# Patient Record
Sex: MALE | Race: AMERICAN INDIAN OR ALASKA NATIVE
[De-identification: names, ages, dates, MRNs, and addresses within clinical notes are randomized per-mention and may not be internally consistent; named-entity substitution may affect disease eponyms.]

---

## 2021-03-13 ENCOUNTER — HOSPITAL ENCOUNTER (EMERGENCY)
Dept: HOSPITAL 5 - ED | Age: 31
Discharge: HOME | End: 2021-03-13
Payer: SELF-PAY

## 2021-03-13 VITALS — DIASTOLIC BLOOD PRESSURE: 84 MMHG | SYSTOLIC BLOOD PRESSURE: 131 MMHG
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
## 2021-03-13 DIAGNOSIS — T16.1XXA: Primary | ICD-10-CM
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
## 2021-03-13 DIAGNOSIS — F17.200: ICD-10-CM

## 2021-03-13 DIAGNOSIS — Z79.899: ICD-10-CM
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
## 2021-03-13 PROCEDURE — 99281 EMR DPT VST MAYX REQ PHY/QHP: CPT

## 2021-03-13 NOTE — EMERGENCY DEPARTMENT REPORT
ED ENT HPI





- General


Chief complaint: Earache


Stated complaint: EAR PAIN SOMETHING IS IN EAR


Time Seen by Provider: 03/13/21 12:12


Source: patient


Mode of arrival: Ambulatory


Limitations: No Limitations





- History of Present Illness


Initial comments: 





31-year-old male presents emerged department complaining of pain to the right 

ear suspicion of having a foreign body of an unknown etiology in the ear would 

like evaluated reports no dizziness, no vomiting, no fever, chills, sweats, no 

bleeding.


MD complaint: ear pain


-: Gradual


Location: R ear


Severity: mild


Quality: aching, dull


Consistency: constant


Associated Symptoms: denies: gum swelling, pain with swallowing, sore throat, 

tinnitus, rhinorrhea





- Related Data


                                  Previous Rx's











 Medication  Instructions  Recorded  Last Taken  Type


 


Promethazine /Codeine 5 ml PO Q6H PRN #90 ml 01/04/14 Unknown Rx





[Phenergan/Codeine 6.25-10 mg/5 ml]    


 


Neomy/Polymyx B/Hc (Otic) Soln 4 drops AD TID #1 bottle 03/13/21 Unknown Rx





[Cortisporin (Otic) Soln]    











                                    Allergies











Allergy/AdvReac Type Severity Reaction Status Date / Time


 


No Known Allergies Allergy   Unverified 01/04/14 19:28














ED Dental HPI





- General


Chief complaint: Earache


Stated complaint: EAR PAIN SOMETHING IS IN EAR


Time Seen by Provider: 03/13/21 12:12


Source: patient


Mode of arrival: Ambulatory


Limitations: No Limitations





- Related Data


                                  Previous Rx's











 Medication  Instructions  Recorded  Last Taken  Type


 


Promethazine /Codeine 5 ml PO Q6H PRN #90 ml 01/04/14 Unknown Rx





[Phenergan/Codeine 6.25-10 mg/5 ml]    


 


Neomy/Polymyx B/Hc (Otic) Soln 4 drops AD TID #1 bottle 03/13/21 Unknown Rx





[Cortisporin (Otic) Soln]    











                                    Allergies











Allergy/AdvReac Type Severity Reaction Status Date / Time


 


No Known Allergies Allergy   Unverified 01/04/14 19:28














ED Review of Systems


ROS: 


Stated complaint: EAR PAIN SOMETHING IS IN EAR


Other details as noted in HPI





Comment: All other systems reviewed and negative





ED Past Medical Hx





- Past Medical History


Previous Medical History?: No





- Surgical History


Past Surgical History?: No





- Social History


Smoking Status: Current Every Day Smoker


Substance Use Type: Alcohol





- Medications


Home Medications: 


                                Home Medications











 Medication  Instructions  Recorded  Confirmed  Last Taken  Type


 


Promethazine /Codeine 5 ml PO Q6H PRN #90 ml 01/04/14  Unknown Rx





[Phenergan/Codeine 6.25-10 mg/5 ml]     


 


Neomy/Polymyx B/Hc (Otic) Soln 4 drops AD TID #1 bottle 03/13/21  Unknown Rx





[Cortisporin (Otic) Soln]     














ED Physical Exam





- General


Limitations: No Limitations


General appearance: alert, in no apparent distress





- Head


Head exam: Present: atraumatic, normocephalic





- Eye


Eye exam: Present: normal appearance





- ENT


ENT exam: Present: mucous membranes moist, other (Green claylike foreign body 

substance into the right ear eardrum appears to be intact with small effusion.  

No bleeding at present)





- Neck


Neck exam: Present: normal inspection





- Respiratory


Respiratory exam: Present: normal lung sounds bilaterally.  Absent: respiratory 

distress





- Cardiovascular


Cardiovascular Exam: Present: regular rate, normal rhythm.  Absent: systolic 

murmur, diastolic murmur, rubs, gallop





- GI/Abdominal


GI/Abdominal exam: Present: soft, normal bowel sounds





- Rectal


Rectal exam: Present: deferred





- Extremities Exam


Extremities exam: Present: normal inspection





- Back Exam


Back exam: Present: normal inspection





- Neurological Exam


Neurological exam: Present: alert, oriented X3





- Psychiatric


Psychiatric exam: Present: normal affect, normal mood





- Skin


Skin exam: Present: warm, dry, intact, normal color.  Absent: rash





ED Course





                                   Vital Signs











  03/13/21





  11:42


 


Temperature 97.6 F


 


Pulse Rate 68


 


Respiratory 20





Rate 


 


Blood Pressure 131/84


 


O2 Sat by Pulse 100





Oximetry 














- Foreign Body Removal Ear


Location: ear canal (R)


Foreign Body Suspected: other


If Insect Suspected: ear canal inspected-intac


Foreign Body Removed: yes


Foreign Body Removal Technique: irrigation


Tympanic Membrane Intact: Yes


Patient Tolerated Procedure: well


Complications: bleeding (Scant bleeding to the floor of the ear canal where the 

foreign substance was removed)


Critical care attestation.: 


If time is entered above; I have spent that time in minutes in the direct care 

of this critically ill patient, excluding procedure time.








ED Disposition


Clinical Impression: 


 Ear foreign body





Disposition: DC-01 TO HOME OR SELFCARE


Is pt being admited?: No


Does the pt Need Aspirin: No


Condition: Stable


Instructions:  Ear Foreign Body


Prescriptions: 


Neomy/Polymyx B/Hc (Otic) Soln [Cortisporin (Otic) Soln] 4 drops AD TID #1 

bottle


Referrals: 


[facility] [Provider Group] - 3-5 Days

## 2021-04-25 ENCOUNTER — HOSPITAL ENCOUNTER (EMERGENCY)
Dept: HOSPITAL 5 - ED | Age: 31
Discharge: HOME | End: 2021-04-25
Payer: SELF-PAY

## 2021-04-25 VITALS — SYSTOLIC BLOOD PRESSURE: 127 MMHG | DIASTOLIC BLOOD PRESSURE: 86 MMHG

## 2021-04-25 DIAGNOSIS — Z79.899: ICD-10-CM

## 2021-04-25 DIAGNOSIS — F17.200: ICD-10-CM

## 2021-04-25 DIAGNOSIS — R10.9: ICD-10-CM

## 2021-04-25 DIAGNOSIS — N20.0: Primary | ICD-10-CM

## 2021-04-25 LAB
ALBUMIN SERPL-MCNC: 4.5 G/DL (ref 3.9–5)
ALT SERPL-CCNC: 15 UNITS/L (ref 7–56)
BASOPHILS # (AUTO): 0.1 K/MM3 (ref 0–0.1)
BASOPHILS NFR BLD AUTO: 0.9 % (ref 0–1.8)
BILIRUB UR QL STRIP: (no result)
BLOOD UR QL VISUAL: (no result)
BUN SERPL-MCNC: 8 MG/DL (ref 9–20)
BUN/CREAT SERPL: 8 %
CALCIUM SERPL-MCNC: 9.3 MG/DL (ref 8.4–10.2)
EOSINOPHIL # BLD AUTO: 0.3 K/MM3 (ref 0–0.4)
EOSINOPHIL NFR BLD AUTO: 3.5 % (ref 0–4.3)
HCT VFR BLD CALC: 40.9 % (ref 35.5–45.6)
HEMOLYSIS INDEX: 5
HGB BLD-MCNC: 14 GM/DL (ref 11.8–15.2)
LYMPHOCYTES # BLD AUTO: 1.6 K/MM3 (ref 1.2–5.4)
LYMPHOCYTES NFR BLD AUTO: 22.2 % (ref 13.4–35)
MCHC RBC AUTO-ENTMCNC: 34 % (ref 32–34)
MCV RBC AUTO: 81 FL (ref 84–94)
MONOCYTES # (AUTO): 0.6 K/MM3 (ref 0–0.8)
MONOCYTES % (AUTO): 8.9 % (ref 0–7.3)
MUCOUS THREADS #/AREA URNS HPF: (no result) /HPF
PH UR STRIP: 7 [PH] (ref 5–7)
PLATELET # BLD: 366 K/MM3 (ref 140–440)
PROT UR STRIP-MCNC: (no result) MG/DL
RBC # BLD AUTO: 5.06 M/MM3 (ref 3.65–5.03)
RBC #/AREA URNS HPF: 1 /HPF (ref 0–6)
UROBILINOGEN UR-MCNC: < 2 MG/DL (ref ?–2)
WBC #/AREA URNS HPF: < 1 /HPF (ref 0–6)

## 2021-04-25 PROCEDURE — 80053 COMPREHEN METABOLIC PANEL: CPT

## 2021-04-25 PROCEDURE — 83690 ASSAY OF LIPASE: CPT

## 2021-04-25 PROCEDURE — 36415 COLL VENOUS BLD VENIPUNCTURE: CPT

## 2021-04-25 PROCEDURE — 85025 COMPLETE CBC W/AUTO DIFF WBC: CPT

## 2021-04-25 PROCEDURE — 74176 CT ABD & PELVIS W/O CONTRAST: CPT

## 2021-04-25 PROCEDURE — 81001 URINALYSIS AUTO W/SCOPE: CPT

## 2021-04-25 NOTE — EMERGENCY DEPARTMENT REPORT
ED General Adult HPI





- General


Chief complaint: Abdominal Pain


Stated complaint: RIGHT SIDE PAIN


Time Seen by Provider: 04/25/21 19:22


Source: patient


Mode of arrival: Ambulatory


Limitations: No Limitations





- History of Present Illness


Initial comments: 





31-year-old -American male patient presents with complaints of right 

flank pain intermittently x2 months, worsening over the past few days.  He 

reports a history of right renal stone and states he has followed up with 

urology concerning this.  He denies any dysuria/hematuria, urinary frequency, 

nausea/vomiting, constipation/diarrhea, hematochezia/melena, or 

fever/chills/sweats.  No shortness of breath or chest pain per patient.  He 

denies any other past medical history and has not tried any OTC medication for 

his symptoms.  He he also denies any recent heavy lifting or numbness/tingling/

weakness in his limbs or difficulty with ambulation


-: Gradual





- Related Data


                                  Previous Rx's











 Medication  Instructions  Recorded  Last Taken  Type


 


Promethazine /Codeine 5 ml PO Q6H PRN #90 ml 01/04/14 Unknown Rx





[Phenergan/Codeine 6.25-10 mg/5 ml]    


 


Neomy/Polymyx B/Hc (Otic) Soln 4 drops AD TID #1 bottle 03/13/21 Unknown Rx





[Cortisporin (Otic) Soln]    


 


Ibuprofen [Motrin 800 MG tab] 800 mg PO Q8HR PRN #20 tablet 04/25/21 Unknown Rx











                                    Allergies











Allergy/AdvReac Type Severity Reaction Status Date / Time


 


No Known Allergies Allergy   Unverified 01/04/14 19:28














ED Review of Systems


ROS: 


Stated complaint: RIGHT SIDE PAIN


Other details as noted in HPI





Constitutional: denies: chills, diaphoresis, fever, malaise, weakness


Respiratory: denies: cough, shortness of breath


Cardiovascular: denies: chest pain


Gastrointestinal: abdominal pain.  denies: nausea, vomiting, diarrhea, 

constipation, hematemesis, melena, hematochezia


Genitourinary: denies: urgency, dysuria, frequency, hematuria


Musculoskeletal: as per HPI





ED Past Medical Hx





- Past Medical History


Previous Medical History?: Yes


Hx Kidney Stones: Yes





- Surgical History


Past Surgical History?: No





- Social History


Smoking Status: Current Every Day Smoker


Substance Use Type: Alcohol





- Medications


Home Medications: 


                                Home Medications











 Medication  Instructions  Recorded  Confirmed  Last Taken  Type


 


Promethazine /Codeine 5 ml PO Q6H PRN #90 ml 01/04/14  Unknown Rx





[Phenergan/Codeine 6.25-10 mg/5 ml]     


 


Neomy/Polymyx B/Hc (Otic) Soln 4 drops AD TID #1 bottle 03/13/21  Unknown Rx





[Cortisporin (Otic) Soln]     


 


Ibuprofen [Motrin 800 MG tab] 800 mg PO Q8HR PRN #20 tablet 04/25/21  Unknown Rx














ED Physical Exam





- General


Limitations: No Limitations


General appearance: alert, in no apparent distress





- Head


Head exam: Present: atraumatic, normocephalic





- Eye


Eye exam: Present: normal appearance.  Absent: scleral icterus





- Respiratory


Respiratory exam: Present: normal lung sounds bilaterally.  Absent: respiratory 

distress





- Cardiovascular


Cardiovascular Exam: Present: regular rate, normal rhythm





- GI/Abdominal


GI/Abdominal exam: Present: soft, tenderness (Mild right sided tenderness to 

palpation on), normal bowel sounds.  Absent: distended, guarding, rebound, rigid





- Extremities Exam


Extremities exam: Present: normal inspection





- Back Exam


Back exam: Present: normal inspection, full ROM, CVA tenderness (R) (Mild).  Abs

ent: paraspinal tenderness, vertebral tenderness





- Neurological Exam


Neurological exam: Present: alert, oriented X3, normal gait.  Absent: motor 

sensory deficit





- Expanded Neurological Exam


  ** Expanded


Sensory exam: Lower Extremity Light Touch: Normal


Motor strength exam: RLE: 5, LLE: 5





- Psychiatric


Psychiatric exam: Present: normal affect, normal mood





- Skin


Skin exam: Present: warm, dry, intact, normal color.  Absent: rash





ED Course


                                   Vital Signs











  04/25/21





  18:48


 


Temperature 99.7 F H


 


Pulse Rate 83


 


Respiratory 20





Rate 


 


Blood Pressure 127/86


 


O2 Sat by Pulse 96





Oximetry 














ED Medical Decision Making





- Lab Data


Result diagrams: 


                                 04/25/21 19:32





                                 04/25/21 19:32








                                   Lab Results











  04/25/21 04/25/21 04/25/21 Range/Units





  19:32 19:32 19:47 


 


WBC  7.1    (4.5-11.0)  K/mm3


 


RBC  5.06 H    (3.65-5.03)  M/mm3


 


Hgb  14.0    (11.8-15.2)  gm/dl


 


Hct  40.9    (35.5-45.6)  %


 


MCV  81 L    (84-94)  fl


 


MCH  28    (28-32)  pg


 


MCHC  34    (32-34)  %


 


RDW  12.8 L    (13.2-15.2)  %


 


Plt Count  366    (140-440)  K/mm3


 


Lymph % (Auto)  22.2    (13.4-35.0)  %


 


Mono % (Auto)  8.9 H    (0.0-7.3)  %


 


Eos % (Auto)  3.5    (0.0-4.3)  %


 


Baso % (Auto)  0.9    (0.0-1.8)  %


 


Lymph # (Auto)  1.6    (1.2-5.4)  K/mm3


 


Mono # (Auto)  0.6    (0.0-0.8)  K/mm3


 


Eos # (Auto)  0.3    (0.0-0.4)  K/mm3


 


Baso # (Auto)  0.1    (0.0-0.1)  K/mm3


 


Seg Neutrophils %  64.5    (40.0-70.0)  %


 


Seg Neutrophils #  4.6    (1.8-7.7)  K/mm3


 


Sodium   137   (137-145)  mmol/L


 


Potassium   4.8   (3.6-5.0)  mmol/L


 


Chloride   99.6   ()  mmol/L


 


Carbon Dioxide   31 H   (22-30)  mmol/L


 


Anion Gap   40   mmol/L


 


BUN   8 L   (9-20)  mg/dL


 


Creatinine   1.0   (0.8-1.3)  mg/dL


 


Estimated GFR   > 60   ml/min


 


BUN/Creatinine Ratio   8   %


 


Glucose   86   ()  mg/dL


 


Calcium   9.3   (8.4-10.2)  mg/dL


 


Total Bilirubin   0.30   (0.1-1.2)  mg/dL


 


AST   18   (5-40)  units/L


 


ALT   15   (7-56)  units/L


 


Alkaline Phosphatase   42   ()  units/L


 


Total Protein   7.9   (6.3-8.2)  g/dL


 


Albumin   4.5   (3.9-5)  g/dL


 


Albumin/Globulin Ratio   1.3   %


 


Lipase   19   (13-60)  units/L


 


Urine Color    Yellow  (Yellow)  


 


Urine Turbidity    Clear  (Clear)  


 


Urine pH    7.0  (5.0-7.0)  


 


Ur Specific Gravity    1.018  (1.003-1.030)  


 


Urine Protein    <15 mg/dl  (Negative)  mg/dL


 


Urine Glucose (UA)    Neg  (Negative)  mg/dL


 


Urine Ketones    Neg  (Negative)  mg/dL


 


Urine Blood    Neg  (Negative)  


 


Urine Nitrite    Neg  (Negative)  


 


Urine Bilirubin    Neg  (Negative)  


 


Urine Urobilinogen    < 2.0  (<2.0)  mg/dL


 


Ur Leukocyte Esterase    Neg  (Negative)  


 


Urine WBC (Auto)    < 1.0  (0.0-6.0)  /HPF


 


Urine RBC (Auto)    1.0  (0.0-6.0)  /HPF


 


Urine Mucus    Few  /HPF














- Radiology Data


Radiology results: report reviewed





 


CT abdomen pelvis wo con  


 


 INDICATION:  


 R flank pain/R LLQ pain, hx of kidney stones.   


 


 COMPARISON: None  


 


 TECHNIQUE: Abdominal and pelvic CT exam performed. All CT scans at this 

location are performed 


using CT dose reduction for ALARA by means of automated exposure control.  


 


 FINDINGS:  


 


 CT ABDOMEN and PELVIS:  


 Lung Bases: No significant abnormality.  


 Liver: No significant abnormality.  


 Biliary: No significant abnormality.  


 Spleen: No significant abnormality.  


 Pancreas: No significant abnormality.  


 Adrenals: No significant abnormality.  


 Kidneys: Punctate right interpolar nodular to renal stone. No hydronephrosis.  


 Lymphatics: No lymphadenopathy.  


 Vasculature: No significant abnormality.   


 Bowel: No significant abnormality.  Normal appendix.  


 Pelvis: No significant abnormality.  


 Osseous Structures: No aggressive osseous lesion.  


 Additional Findings: None  


 


 IMPRESSION:  


 1. No acute abnormality of the abdomen or pelvis.  


 2. Punctate nonobstructing right renal stone.  





- Medical Decision Making








31-year-old -American male patient presents with complaints of right 

flank pain intermittently x2 months, worsening over the past few days.  He 

reports a history of right renal stone and states he has followed up with 

urology concerning this.  He denies any dysuria/hematuria, urinary frequency, 

nausea/vomiting, constipation/diarrhea, hematochezia/melena, or 

fever/chills/sweats.  No shortness of breath or chest pain per patient.  He 

denies any other past medical history and has not tried any OTC medication for 

his symptoms.  He he also denies any recent heavy lifting or 

numbness/tingling/weakness in his limbs or difficulty with ambulation





Anion gap noted to be 40 on labs, however this was manually calculated at 6.5.





Mild right CVA tenderness and right-sided tenderness on exam.  CT abdomen shows 

renal stone without obstruction.  No other acute abnormalities noted on CT.  

CBC, CMP, and UA are without significant abnormalities.  Recommend patient 

continues to follow-up with urology.  We will try ibuprofen as needed for pain 

for now.  Discussed signs and symptoms that should prompt immediate return to 

the emergency department in detail with patient who verbalizes understanding.  

He is well-appearing, his vitals are normal, he is stable for discharge home.


Critical care attestation.: 


If time is entered above; I have spent that time in minutes in the direct care 

of this critically ill patient, excluding procedure time.








ED Disposition


Clinical Impression: 


 Flank pain, Right kidney stone





Disposition: DC-01 TO HOME OR SELFCARE


Is pt being admited?: No


Condition: Stable


Instructions:  Flank Pain, Adult, Easy-to-Read, Kidney Stones, Easy-to-Read, 

Renal Colic, Easy-to-Read


Additional Instructions: 


Please follow-up with your urologist within 1 week.


Prescriptions: 


Ibuprofen [Motrin 800 MG tab] 800 mg PO Q8HR PRN #20 tablet


 PRN Reason: pain


Referrals: 


PRIMARY CARE,MD [Primary Care Provider] - 3-5 Days


[facility] [Provider Group] - 3-5 Days

## 2021-04-25 NOTE — CAT SCAN REPORT
CT abdomen pelvis wo con



INDICATION:

R flank pain/R LLQ pain, hx of kidney stones. 



COMPARISON: None



TECHNIQUE: Abdominal and pelvic CT exam performed. All CT scans at this location are performed using 
CT dose reduction for ALARA by means of automated exposure control.



FINDINGS:



CT ABDOMEN and PELVIS:

Lung Bases: No significant abnormality.

Liver: No significant abnormality.

Biliary: No significant abnormality.

Spleen: No significant abnormality.

Pancreas: No significant abnormality.

Adrenals: No significant abnormality.

Kidneys: Punctate right interpolar nodular to renal stone. No hydronephrosis.

Lymphatics: No lymphadenopathy.

Vasculature: No significant abnormality. 

Bowel: No significant abnormality.  Normal appendix.

Pelvis: No significant abnormality.

Osseous Structures: No aggressive osseous lesion.

Additional Findings: None



IMPRESSION:

1. No acute abnormality of the abdomen or pelvis.

2. Punctate nonobstructing right renal stone.



Signer Name: Tj Lubin MD 

Signed: 4/25/2021 7:53 PM

Workstation Name: [id number]

## 2021-10-06 ENCOUNTER — HOSPITAL ENCOUNTER (EMERGENCY)
Dept: HOSPITAL 5 - ED | Age: 31
Discharge: HOME | End: 2021-10-06
Payer: SELF-PAY

## 2021-10-06 VITALS — DIASTOLIC BLOOD PRESSURE: 80 MMHG | SYSTOLIC BLOOD PRESSURE: 133 MMHG

## 2021-10-06 DIAGNOSIS — F17.200: ICD-10-CM

## 2021-10-06 DIAGNOSIS — M62.830: Primary | ICD-10-CM

## 2021-10-06 DIAGNOSIS — Z87.442: ICD-10-CM

## 2021-10-06 DIAGNOSIS — M54.50: ICD-10-CM

## 2021-10-06 DIAGNOSIS — Z79.899: ICD-10-CM

## 2021-10-06 DIAGNOSIS — Z72.89: ICD-10-CM

## 2021-10-06 PROCEDURE — 99281 EMR DPT VST MAYX REQ PHY/QHP: CPT

## 2021-10-06 NOTE — EMERGENCY DEPARTMENT REPORT
ED Back Pain/Injury HPI





- General


Chief Complaint: Back Pain/Injury


Stated Complaint: BACK STIFNESS


Time Seen by Provider: 10/06/21 21:18


Source: patient


Limitations: No Limitations





- History of Present Illness


MD Complaint: back pain, back injury


-: Gradual, days(s) (14)


Place: other (working out and developed a very stiff back)


Radiation: none


Severity: mild, moderate


Quality: dull, aching


Consistency: constant


Improves With: none


Worsens With: none





- Related Data


                                  Previous Rx's











 Medication  Instructions  Recorded  Last Taken  Type


 


Promethazine /Codeine 5 ml PO Q6H PRN #90 ml 01/04/14 Unknown Rx





[Phenergan/Codeine 6.25-10 mg/5 ml]    


 


Neomy/Polymyx B/Hc (Otic) Soln 4 drops AD TID #1 bottle 03/13/21 Unknown Rx





[Cortisporin (Otic) Soln]    


 


Ibuprofen [Motrin 800 MG tab] 800 mg PO Q8HR PRN #20 tablet 04/25/21 Unknown Rx


 


Ketorolac [Toradol] 10 mg PO Q6H PRN #15 tablet 10/06/21 Unknown Rx


 


methOCARBAMOL [Robaxin] 750 mg PO Q8H PRN #21 tablet 10/06/21 Unknown Rx











                                    Allergies











Allergy/AdvReac Type Severity Reaction Status Date / Time


 


No Known Allergies Allergy   Unverified 01/04/14 19:28














ED Review of Systems


ROS: 


Stated complaint: BACK STIFNESS


Other details as noted in HPI





Comment: All other systems reviewed and negative





ED Past Medical Hx





- Past Medical History


Previous Medical History?: Yes


Hx Kidney Stones: Yes





- Surgical History


Past Surgical History?: No





- Social History


Smoking Status: Current Every Day Smoker


Substance Use Type: Alcohol





- Medications


Home Medications: 


                                Home Medications











 Medication  Instructions  Recorded  Confirmed  Last Taken  Type


 


Promethazine /Codeine 5 ml PO Q6H PRN #90 ml 01/04/14  Unknown Rx





[Phenergan/Codeine 6.25-10 mg/5 ml]     


 


Neomy/Polymyx B/Hc (Otic) Soln 4 drops AD TID #1 bottle 03/13/21  Unknown Rx





[Cortisporin (Otic) Soln]     


 


Ibuprofen [Motrin 800 MG tab] 800 mg PO Q8HR PRN #20 tablet 04/25/21  Unknown Rx


 


Ketorolac [Toradol] 10 mg PO Q6H PRN #15 tablet 10/06/21  Unknown Rx


 


methOCARBAMOL [Robaxin] 750 mg PO Q8H PRN #21 tablet 10/06/21  Unknown Rx














ED Physical Exam





- General


Limitations: No Limitations


General appearance: alert, in no apparent distress





- Head


Head exam: Present: atraumatic, normocephalic





- Eye


Eye exam: Present: normal appearance





- ENT


ENT exam: Present: mucous membranes moist





- Neck


Neck exam: Present: normal inspection





- Respiratory


Respiratory exam: Present: normal lung sounds bilaterally.  Absent: respiratory 

distress





- Cardiovascular


Cardiovascular Exam: Present: regular rate, normal rhythm.  Absent: systolic 

murmur, diastolic murmur, rubs, gallop





- GI/Abdominal


GI/Abdominal exam: Present: soft, normal bowel sounds





- Rectal


Rectal exam: Present: deferred





- Extremities Exam


Extremities exam: Present: normal inspection, full ROM, normal capillary refill





- Back Exam


Back exam: Present: normal inspection.  Absent: CVA tenderness (R), CVA 

tenderness (L)





- Neurological Exam


Neurological exam: Present: alert, oriented X3, CN II-XII intact





- Psychiatric


Psychiatric exam: Present: normal affect, normal mood





- Skin


Skin exam: Present: warm, dry, intact, normal color.  Absent: rash





ED Course


                                   Vital Signs











  10/06/21





  21:11


 


Temperature 98.3 F


 


Pulse Rate 89


 


Respiratory 16





Rate 


 


Blood Pressure 133/80


 


O2 Sat by Pulse 100





Oximetry 











Critical care attestation.: 


If time is entered above; I have spent that time in minutes in the direct care 

of this critically ill patient, excluding procedure time.








ED Disposition


Clinical Impression: 


 Lumbar paraspinal muscle spasm





Disposition: 01 HOME / SELF CARE / HOMELESS


Is pt being admited?: No


Does the pt Need Aspirin: No


Condition: Stable


Instructions:  Muscle Cramps and Spasms, Easy-to-Read, Thoracic Strain, Back 

Injury Prevention


Prescriptions: 


methOCARBAMOL [Robaxin] 750 mg PO Q8H PRN #21 tablet


 PRN Reason: Spasms


Ketorolac [Toradol] 10 mg PO Q6H PRN #15 tablet


 PRN Reason: Pain


Referrals: 


DAYANA SAVAGE MD [Staff Physician] - 3-5 Days

## 2022-08-13 ENCOUNTER — HOSPITAL ENCOUNTER (EMERGENCY)
Dept: HOSPITAL 5 - ED | Age: 32
LOS: 1 days | Discharge: HOME | End: 2022-08-14
Payer: SELF-PAY

## 2022-08-13 DIAGNOSIS — M54.6: Primary | ICD-10-CM

## 2022-08-13 DIAGNOSIS — Z87.442: ICD-10-CM

## 2022-08-13 DIAGNOSIS — Z79.899: ICD-10-CM

## 2022-08-13 PROCEDURE — 72070 X-RAY EXAM THORAC SPINE 2VWS: CPT

## 2022-08-13 PROCEDURE — 99283 EMERGENCY DEPT VISIT LOW MDM: CPT

## 2022-08-13 PROCEDURE — 72100 X-RAY EXAM L-S SPINE 2/3 VWS: CPT

## 2022-08-13 NOTE — XRAY REPORT
Lumbar spine 3 views



INDICATION: Back pain



FINDINGS: Alignment appears normal. No compression fractures seen. Sacrum and sacroiliac joints appea
r normal.



Thoracic spine 4 views



INDICATION: Back pain



FINDINGS: Pedicles appear normal throughout. No compression fractures seen. No subluxation. Facets ar
e well aligned.



Signer Name: Bruno Biswas MD 

Signed: 8/13/2022 11:08 PM

Workstation Name: [email or web address]

## 2022-08-13 NOTE — XRAY REPORT
Lumbar spine 3 views



INDICATION: Back pain



FINDINGS: Alignment appears normal. No compression fractures seen. Sacrum and sacroiliac joints appea
r normal.



Thoracic spine 4 views



INDICATION: Back pain



FINDINGS: Pedicles appear normal throughout. No compression fractures seen. No subluxation. Facets ar
e well aligned.



Signer Name: Bruno Biswas MD 

Signed: 8/13/2022 11:08 PM

Workstation Name: [email or web address]

## 2022-08-14 VITALS — SYSTOLIC BLOOD PRESSURE: 132 MMHG | DIASTOLIC BLOOD PRESSURE: 76 MMHG

## 2022-08-14 NOTE — EMERGENCY DEPARTMENT REPORT
ED Back Pain/Injury HPI





- General


Chief Complaint: Back Pain/Injury


Stated Complaint: BACK PAIN


Time Seen by Provider: 08/14/22 02:32


Source: patient


Limitations: No Limitations





- History of Present Illness


Initial Comments: 





32-year-old  with my department complaining of waxing and waning

pain to the mid back but spastic type nature worsening with palpation and range 

of motion and continuing symptoms for the last for 45 to 60 days


MD Complaint: back pain


-: Gradual


Place: home


Radiation: none


Severity: mild





- Related Data


                                  Previous Rx's











 Medication  Instructions  Recorded  Last Taken  Type


 


Promethazine /Codeine 5 ml PO Q6H PRN #90 ml 01/04/14 Unknown Rx





[Phenergan/Codeine 6.25-10 mg/5 ml]    


 


Neomy/Polymyx B/Hc (Otic) Soln 4 drops AD TID #1 bottle 03/13/21 Unknown Rx





[Cortisporin (Otic) Soln]    


 


Ibuprofen [Motrin 800 MG tab] 800 mg PO Q8HR PRN #20 tablet 04/25/21 Unknown Rx


 


Ketorolac [Toradol] 10 mg PO Q6H PRN #15 tablet 10/06/21 Unknown Rx


 


methOCARBAMOL [Robaxin] 750 mg PO Q8H PRN #21 tablet 10/06/21 Unknown Rx


 


Ketorolac [Toradol] 10 mg PO Q6H PRN #15 tablet 08/14/22 Unknown Rx


 


methOCARBAMOL [Robaxin] 750 mg PO Q8H PRN #21 tablet 08/14/22 Unknown Rx











                                    Allergies











Allergy/AdvReac Type Severity Reaction Status Date / Time


 


No Known Allergies Allergy   Unverified 01/04/14 19:28














ED Review of Systems


ROS: 


Stated complaint: BACK PAIN


Other details as noted in HPI





Comment: All other systems reviewed and negative





ED Past Medical Hx





- Past Medical History


Previous Medical History?: Yes


Hx Kidney Stones: Yes





- Social History


Smoking Status: Unknown if ever smoked





- Medications


Home Medications: 


                                Home Medications











 Medication  Instructions  Recorded  Confirmed  Last Taken  Type


 


Promethazine /Codeine 5 ml PO Q6H PRN #90 ml 01/04/14  Unknown Rx





[Phenergan/Codeine 6.25-10 mg/5 ml]     


 


Neomy/Polymyx B/Hc (Otic) Soln 4 drops AD TID #1 bottle 03/13/21  Unknown Rx





[Cortisporin (Otic) Soln]     


 


Ibuprofen [Motrin 800 MG tab] 800 mg PO Q8HR PRN #20 tablet 04/25/21  Unknown Rx


 


Ketorolac [Toradol] 10 mg PO Q6H PRN #15 tablet 10/06/21  Unknown Rx


 


methOCARBAMOL [Robaxin] 750 mg PO Q8H PRN #21 tablet 10/06/21  Unknown Rx


 


Ketorolac [Toradol] 10 mg PO Q6H PRN #15 tablet 08/14/22  Unknown Rx


 


methOCARBAMOL [Robaxin] 750 mg PO Q8H PRN #21 tablet 08/14/22  Unknown Rx














ED Physical Exam





- General


Limitations: No Limitations


General appearance: alert, in no apparent distress





- Head


Head exam: Present: atraumatic, normocephalic





- Eye


Eye exam: Present: normal appearance, PERRL, EOMI


Pupils: Present: normal accommodation





- ENT


ENT exam: Present: normal exam, normal orophraynx, mucous membranes moist





- Neck


Neck exam: Present: normal inspection





- Respiratory


Respiratory exam: Present: normal lung sounds bilaterally.  Absent: respiratory 

distress





- Cardiovascular


Cardiovascular Exam: Present: regular rate, normal rhythm.  Absent: systolic 

murmur, diastolic murmur, rubs, gallop





- GI/Abdominal


GI/Abdominal exam: Present: soft, normal bowel sounds





- Rectal


Rectal exam: Present: deferred





- Extremities Exam


Extremities exam: Present: normal inspection





- Back Exam


Back exam: Present: normal inspection, muscle spasm, paraspinal tenderness.  

Absent: CVA tenderness (R), CVA tenderness (L), rash noted





- Neurological Exam


Neurological exam: Present: alert, oriented X3





- Psychiatric


Psychiatric exam: Present: normal affect, normal mood





- Skin


Skin exam: Present: warm, dry, intact, normal color.  Absent: rash





ED Course





                                   Vital Signs











  08/13/22





  22:34


 


Temperature 98.4 F


 


Pulse Rate 60


 


Respiratory 16





Rate 


 


Blood Pressure 137/63


 


Blood Pressure 137/63





[Right] 


 


O2 Sat by Pulse 100





Oximetry 











Critical care attestation.: 


If time is entered above; I have spent that time in minutes in the direct care 

of this critically ill patient, excluding procedure time.








ED Disposition


Clinical Impression: 


 Back pain





Disposition: 01 HOME / SELF CARE / HOMELESS


Is pt being admited?: No


Does the pt Need Aspirin: No


Condition: Stable


Instructions:  What You Need to Know About Chronic Back Pain, Back Injury 

Prevention, Acute Back Pain, Adult


Prescriptions: 


methOCARBAMOL [Robaxin] 750 mg PO Q8H PRN #21 tablet


 PRN Reason: Spasms


Ketorolac [Toradol] 10 mg PO Q6H PRN #15 tablet


 PRN Reason: Pain


Referrals: 


[facility] [Provider Group] - 3-5 Days